# Patient Record
Sex: MALE | Race: BLACK OR AFRICAN AMERICAN | Employment: STUDENT | URBAN - METROPOLITAN AREA
[De-identification: names, ages, dates, MRNs, and addresses within clinical notes are randomized per-mention and may not be internally consistent; named-entity substitution may affect disease eponyms.]

---

## 2020-02-13 ENCOUNTER — OFFICE VISIT (OUTPATIENT)
Dept: LAB | Facility: SCHOOL | Age: 20
End: 2020-02-13

## 2020-02-13 VITALS
OXYGEN SATURATION: 100 % | HEART RATE: 51 BPM | SYSTOLIC BLOOD PRESSURE: 90 MMHG | TEMPERATURE: 98.5 F | DIASTOLIC BLOOD PRESSURE: 58 MMHG

## 2020-02-13 DIAGNOSIS — J20.9 ACUTE BRONCHITIS WITH SYMPTOMS > 10 DAYS: Primary | ICD-10-CM

## 2020-02-13 PROCEDURE — 99203 OFFICE O/P NEW LOW 30 MIN: CPT | Performed by: PHYSICIAN ASSISTANT

## 2020-02-13 RX ORDER — ALBUTEROL SULFATE 90 UG/1
2 AEROSOL, METERED RESPIRATORY (INHALATION) EVERY 4 HOURS PRN
Qty: 1 INHALER | Refills: 0 | Status: SHIPPED | OUTPATIENT
Start: 2020-02-13

## 2020-02-13 RX ORDER — BENZONATATE 200 MG/1
200 CAPSULE ORAL 3 TIMES DAILY PRN
Qty: 30 CAPSULE | Refills: 0 | Status: SHIPPED | OUTPATIENT
Start: 2020-02-13 | End: 2020-02-23

## 2020-02-13 RX ORDER — AZITHROMYCIN 250 MG/1
TABLET, FILM COATED ORAL
Qty: 6 TABLET | Refills: 0 | Status: SHIPPED | OUTPATIENT
Start: 2020-02-13

## 2020-02-13 ASSESSMENT — ENCOUNTER SYMPTOMS
CHEST TIGHTNESS: 0
GASTROINTESTINAL NEGATIVE: 1
SORE THROAT: 0
COUGH: 1
PALPITATIONS: 0
SINUS PRESSURE: 1
CHILLS: 0
CARDIOVASCULAR NEGATIVE: 1
WHEEZING: 1
FATIGUE: 0
RHINORRHEA: 1
SINUS PAIN: 1
SHORTNESS OF BREATH: 1
FEVER: 1

## 2020-02-13 NOTE — PROGRESS NOTES
Subjective   Sheila Webb is a 19 year old male who presents to clinic today for the following health issues:  HPI   RESPIRATORY SYMPTOMS    Duration: 1month    Description  Productive cough, wheezing, and shortness of breath but no hemoptysis     Severity: moderate    Accompanying signs and symptoms:  Also reports nasal congestion, rhinorrhea, facial pain/pressure, fever and headache.  No abdominal pain, n/v, constipation, diarrhea, bloody or black tarry stools.     History (predisposing factors):  No ill contacts.  No pmh of asthma.  Non-smoker.    Precipitating or alleviating factors: None    Therapies tried and outcome:  rest and fluids oral decongestant with minimal relief    There is no problem list on file for this patient.    History reviewed. No pertinent surgical history.    Social History     Tobacco Use     Smoking status: Never Smoker     Smokeless tobacco: Never Used   Substance Use Topics     Alcohol use: Yes     Comment: once in awhile, occasional     History reviewed. No pertinent family history.      No current outpatient medications on file.     No Known Allergies    Reviewed and updated as needed this visit by Provider       Review of Systems   Constitutional: Positive for fever. Negative for chills and fatigue.   HENT: Positive for rhinorrhea, sinus pressure and sinus pain. Negative for congestion, ear discharge, ear pain, hearing loss and sore throat.    Respiratory: Positive for cough, shortness of breath and wheezing. Negative for chest tightness.    Cardiovascular: Negative.  Negative for chest pain, palpitations and peripheral edema.   Gastrointestinal: Negative.    All other systems reviewed and are negative.           Objective    BP 90/58 (BP Location: Right arm, Patient Position: Chair, Cuff Size: Adult Regular)   Pulse 51   Temp 98.5  F (36.9  C) (Oral)   SpO2 100%   There is no height or weight on file to calculate BMI.  Physical Exam  Vitals signs and nursing note reviewed.    Constitutional:       General: He is not in acute distress.     Appearance: Normal appearance. He is normal weight. He is not ill-appearing.   HENT:      Head: Normocephalic and atraumatic.      Ears:      Comments: TMs are intact without any erythema or bulging bilaterally.  Airway is patent.     Nose: Congestion and rhinorrhea present.      Right Turbinates: Not swollen.      Left Turbinates: Not swollen.      Right Sinus: No maxillary sinus tenderness or frontal sinus tenderness.      Left Sinus: No maxillary sinus tenderness or frontal sinus tenderness.      Mouth/Throat:      Lips: Pink.      Mouth: Mucous membranes are moist.      Pharynx: Oropharynx is clear. Uvula midline. No pharyngeal swelling, oropharyngeal exudate, posterior oropharyngeal erythema or uvula swelling.      Tonsils: No tonsillar exudate or tonsillar abscesses.   Eyes:      General: No scleral icterus.     Conjunctiva/sclera: Conjunctivae normal.      Pupils: Pupils are equal, round, and reactive to light.   Neck:      Musculoskeletal: Normal range of motion and neck supple.      Thyroid: No thyromegaly.   Cardiovascular:      Rate and Rhythm: Normal rate and regular rhythm.      Pulses: Normal pulses.      Heart sounds: Normal heart sounds, S1 normal and S2 normal. No murmur. No friction rub. No gallop.    Pulmonary:      Effort: Pulmonary effort is normal. No tachypnea, accessory muscle usage, respiratory distress or retractions.      Breath sounds: Normal breath sounds and air entry. No stridor. No decreased breath sounds, wheezing, rhonchi or rales.   Lymphadenopathy:      Cervical: No cervical adenopathy.   Skin:     General: Skin is warm and dry.      Findings: No rash.   Neurological:      Mental Status: He is alert and oriented to person, place, and time.   Psychiatric:         Mood and Affect: Mood normal.         Behavior: Behavior normal.         Thought Content: Thought content normal.         Judgment: Judgment normal.              Assessment & Plan   Acute bronchitis with symptoms > 10 days:  Will treat with zithromax X5days, tessalon perles, and albuterol inh as needed for symptoms.  Recommend treatment with rest, fluids and chicken soup. Tylenol/ibuprofen prn fever/pain.  Recheck in clinic with PCP if symptoms worsen or if symptoms do not improve as we are limited here in the [facility] clinic.  To the ER if he develops hemoptysis, chest pain, fevers>102, worsening shortness of breath/wheezing.    -     azithromycin (ZITHROMAX) 250 MG tablet; 2 tablets the first day, then 1 tablet daily for the next 4 days  -     benzonatate (TESSALON) 200 MG capsule; Take 1 capsule (200 mg) by mouth 3 times daily as needed for cough  -     albuterol (PROAIR HFA/PROVENTIL HFA/VENTOLIN HFA) 108 (90 Base) MCG/ACT inhaler; Inhale 2 puffs into the lungs every 4 hours as needed for shortness of breath / dyspnea or wheezing           Ivanna See ENDY Rodriguez  [facility]